# Patient Record
Sex: MALE | Race: BLACK OR AFRICAN AMERICAN | NOT HISPANIC OR LATINO | Employment: FULL TIME | URBAN - METROPOLITAN AREA
[De-identification: names, ages, dates, MRNs, and addresses within clinical notes are randomized per-mention and may not be internally consistent; named-entity substitution may affect disease eponyms.]

---

## 2024-03-13 ENCOUNTER — OCCMED (OUTPATIENT)
Dept: URGENT CARE | Facility: MEDICAL CENTER | Age: 44
End: 2024-03-13

## 2024-03-13 DIAGNOSIS — Z02.89 ENCOUNTER FOR PHYSICAL EXAMINATION RELATED TO EMPLOYMENT: Primary | ICD-10-CM

## 2025-06-23 VITALS — WEIGHT: 254.8 LBS | BODY MASS INDEX: 34.51 KG/M2 | HEIGHT: 72 IN

## 2025-06-23 DIAGNOSIS — S93.402A SPRAIN OF UNSPECIFIED LIGAMENT OF LEFT ANKLE, INITIAL ENCOUNTER: Primary | ICD-10-CM

## 2025-06-23 PROCEDURE — 99203 OFFICE O/P NEW LOW 30 MIN: CPT | Performed by: STUDENT IN AN ORGANIZED HEALTH CARE EDUCATION/TRAINING PROGRAM

## 2025-06-23 RX ORDER — PANTOPRAZOLE SODIUM 40 MG/1
1 TABLET, DELAYED RELEASE ORAL DAILY
COMMUNITY
Start: 2025-06-11

## 2025-06-23 RX ORDER — SEMAGLUTIDE 2.4 MG/.75ML
INJECTION, SOLUTION SUBCUTANEOUS
COMMUNITY
Start: 2025-05-02

## 2025-06-23 NOTE — PROGRESS NOTES
Ortho Sports Medicine New Patient Ankle Visit    Assesment:  Jacob Henry is a 44 y.o. male who presents with left ankle sprain vs high ankle sprain    Plan:    We had a long discussion regarding the diagnosis and treatment plan.  I recommended starting a course of conservative treatment.  I recommended physical therapy to focus on decreasing swelling, normalizing motion, and progressing to strengthening proprioception. Can continue weight-bearing as tolerated.  I did discuss possible steroid injection, however patient deferred today.  We also discussed possibility of surgical treatment in the future.  At next visit, will reevaluate and if he is still having symptoms, we will likely perform weightbearing as well as external rotation stress views to evaluate for any occult syndesmotic instability.  Depending on results of this, we discussed possibility of surgical treatments.    Recommended ice and NSAIDs as needed for pain.     Follow-up in 6 weeks, likely repeat left ankle stress x-rays after evaluating patient    Chief Complaint   Patient presents with    Left Ankle - Pain       History of Present Illness:  The patient is a 44 y.o. male presenting with pain in the left ankle. He had a mechanical trip and fall, twisted his left ankle and fell under his truck and his leg went out form under him..  He works as a  and this involves a lot of loading and unloading from his truck.  Initially had numbness in his left leg. Went to an urgent care and per his report was sent to the hospital due to concern for compartment syndrome. Date of injury was 1/31/2025.  X-ray showed an avulsion fracture of the distal tibia, and this was initially treated nonsurgically.  He states he completed physical therapy for approximately 4 weeks,, however he continues to have pain and instability.  He feels that the ankle is unstable and painful with ambulation.  Most of his pain is in the front and anterior medial aspect of his  ankle.  His numbness is improved.  No new injuries.  He has tried returning to work, however he continues to have pain affecting his function quality of life.  No injections or prior surgeries. He saw Dr. Feldman for this and was initially treated non-operatively with PT. I reviewed outside notes.      Ankle Surgical History:  None      Past Medical, Social and Family History:  Past Medical History[1]  Past Surgical History[2]  Allergies[3]  Medications Ordered Prior to Encounter[4]  Social History     Socioeconomic History    Marital status: Single     Spouse name: Not on file    Number of children: Not on file    Years of education: Not on file    Highest education level: Not on file   Occupational History    Not on file   Tobacco Use    Smoking status: Unknown    Smokeless tobacco: Not on file   Substance and Sexual Activity    Alcohol use: Not on file    Drug use: Not on file    Sexual activity: Not on file   Other Topics Concern    Not on file   Social History Narrative    Not on file     Social Drivers of Health     Financial Resource Strain: Not on file   Food Insecurity: Not on file   Transportation Needs: Not on file   Physical Activity: Not on file   Stress: Not on file   Social Connections: Not on file   Intimate Partner Violence: Not on file   Housing Stability: Not on file         I have reviewed the past medical, surgical, social and family history, medications and allergies as documented in the EMR.    Review of systems: ROS is negative other than that noted in the HPI.  Constitutional: Negative for fatigue and fever.   HENT: Negative for sore throat.    Respiratory: Negative for shortness of breath.    Cardiovascular: Negative for chest pain.   Gastrointestinal: Negative for abdominal pain.   Endocrine: Negative for cold intolerance and heat intolerance.   Genitourinary: Negative for flank pain.   Musculoskeletal: Negative for back pain.   Skin: Negative for rash.   Allergic/Immunologic: Negative  for immunocompromised state.   Neurological: Negative for dizziness.   Psychiatric/Behavioral: Negative for agitation.      Physical Exam:    There were no vitals filed for this visit.    General/Constitutional: NAD, well developed, well nourished         Ankle Examination (focused):     Skin intact  No Wounds  Swelling: Mild  ROM:    Dorsiflexion: 10   Plantarflexion: 45   Inversion/eversion: 10    TTP over anterior / anteromedial ankle (medial gutter, anterior ankle, tibialis anterior tendon)  NTTP over ATFL / deltoid ligament  Anterior drawer: Negative  Negative eversion test    No subluxation of the peroneal tendons or tenderness to palpation along the peroneal tendons       No pain with palpation or range of motion of midfoot and forefoot bilaterally    No calf tenderness to palpation bilaterally    LE NV Exam: +2 DP/PT pulses bilaterally  Sensation intact to light touch L2-S1 bilaterally      Ankle Imaging:    I personally reviewed x-rays of the L ankle and tib/fib on 1/31/25 and MRI of the L ankle on 3/25/25. X-rays demonstrate minimally displaced avulsion fracture of the medial malleolus tip. Mortise is congruent. No significant degenerative changes.    MRI L ankle demonstrates some thickening / inflammation of AITFL / PITFL and ATFL, fibula appears to be reduced in the incisura, no obvious syndesmotic instability / incongruence / widening. No talar OCD's however there is a tibiotalar joint effusion. Subchondral cyst and edema in the navicular bone.  I reviewed the radiology reports and agree with their findings.         [1] No past medical history on file.  [2] No past surgical history on file.  [3] No Known Allergies  [4]   Current Outpatient Medications on File Prior to Visit   Medication Sig Dispense Refill    pantoprazole (PROTONIX) 40 mg tablet Take 1 tablet by mouth in the morning      tirzepatide (Zepbound) 2.5 mg/0.5 mL auto-injector       Wegovy 2.4 MG/0.75ML INJECT 2.4 MG SUBCUTANEOUS ONCE A WEEK  (Patient not taking: Reported on 6/23/2025)       No current facility-administered medications on file prior to visit.